# Patient Record
Sex: MALE | Race: OTHER | Employment: FULL TIME | URBAN - METROPOLITAN AREA
[De-identification: names, ages, dates, MRNs, and addresses within clinical notes are randomized per-mention and may not be internally consistent; named-entity substitution may affect disease eponyms.]

---

## 2017-12-16 ENCOUNTER — APPOINTMENT (OUTPATIENT)
Dept: CT IMAGING | Age: 42
End: 2017-12-16
Attending: NURSE PRACTITIONER
Payer: COMMERCIAL

## 2017-12-16 ENCOUNTER — HOSPITAL ENCOUNTER (EMERGENCY)
Age: 42
Discharge: HOME | End: 2017-12-16
Attending: EMERGENCY MEDICINE | Admitting: EMERGENCY MEDICINE
Payer: COMMERCIAL

## 2017-12-16 VITALS
SYSTOLIC BLOOD PRESSURE: 142 MMHG | BODY MASS INDEX: 29.38 KG/M2 | RESPIRATION RATE: 16 BRPM | DIASTOLIC BLOOD PRESSURE: 75 MMHG | HEART RATE: 88 BPM | HEIGHT: 65 IN | OXYGEN SATURATION: 96 % | WEIGHT: 176.37 LBS | TEMPERATURE: 98 F

## 2017-12-16 DIAGNOSIS — M54.50 ACUTE RIGHT-SIDED LOW BACK PAIN WITHOUT SCIATICA: Primary | ICD-10-CM

## 2017-12-16 LAB
ANION GAP SERPL CALC-SCNC: 9 MMOL/L (ref 5–15)
APPEARANCE UR: CLEAR
BACTERIA URNS QL MICRO: NEGATIVE /HPF
BASOPHILS # BLD: 0.1 K/UL (ref 0–0.1)
BASOPHILS NFR BLD: 1 % (ref 0–1)
BILIRUB UR QL: NEGATIVE
BUN SERPL-MCNC: 14 MG/DL (ref 6–20)
BUN/CREAT SERPL: 15 (ref 12–20)
CALCIUM SERPL-MCNC: 9.1 MG/DL (ref 8.5–10.1)
CHLORIDE SERPL-SCNC: 102 MMOL/L (ref 97–108)
CO2 SERPL-SCNC: 29 MMOL/L (ref 21–32)
COLOR UR: NORMAL
CREAT SERPL-MCNC: 0.95 MG/DL (ref 0.7–1.3)
DIFFERENTIAL METHOD BLD: NORMAL
EOSINOPHIL # BLD: 0.1 K/UL (ref 0–0.4)
EOSINOPHIL NFR BLD: 2 % (ref 0–7)
EPITH CASTS URNS QL MICRO: NORMAL /LPF
ERYTHROCYTE [DISTWIDTH] IN BLOOD BY AUTOMATED COUNT: 12.2 % (ref 11.5–14.5)
GLUCOSE SERPL-MCNC: 97 MG/DL (ref 65–100)
GLUCOSE UR STRIP.AUTO-MCNC: NEGATIVE MG/DL
HCT VFR BLD AUTO: 47 % (ref 36.6–50.3)
HGB BLD-MCNC: 16.5 G/DL (ref 12.1–17)
HGB UR QL STRIP: NEGATIVE
KETONES UR QL STRIP.AUTO: NEGATIVE MG/DL
LEUKOCYTE ESTERASE UR QL STRIP.AUTO: NEGATIVE
LYMPHOCYTES # BLD: 2.3 K/UL
LYMPHOCYTES NFR BLD: 33 % (ref 12–49)
MCH RBC QN AUTO: 31.1 PG (ref 26–34)
MCHC RBC AUTO-ENTMCNC: 35.1 G/DL (ref 30–36.5)
MCV RBC AUTO: 88.5 FL (ref 80–99)
MONOCYTES # BLD: 0.7 K/UL (ref 0–1)
MONOCYTES NFR BLD: 10 % (ref 5–13)
NEUTS SEG # BLD: 3.8 K/UL (ref 1.8–8)
NEUTS SEG NFR BLD: 54 % (ref 32–75)
NITRITE UR QL STRIP.AUTO: NEGATIVE
PH UR STRIP: 6 [PH] (ref 5–8)
PLATELET # BLD AUTO: 251 K/UL (ref 150–400)
POTASSIUM SERPL-SCNC: 3.8 MMOL/L (ref 3.5–5.1)
PROT UR STRIP-MCNC: NEGATIVE MG/DL
RBC # BLD AUTO: 5.31 M/UL (ref 4.1–5.7)
RBC #/AREA URNS HPF: NORMAL /HPF (ref 0–5)
RBC MORPH BLD: NORMAL
SODIUM SERPL-SCNC: 140 MMOL/L (ref 136–145)
SP GR UR REFRACTOMETRY: 1.02 (ref 1–1.03)
UA: UC IF INDICATED,UAUC: NORMAL
UROBILINOGEN UR QL STRIP.AUTO: 0.2 EU/DL (ref 0.2–1)
WBC # BLD AUTO: 7 K/UL (ref 4.1–11.1)
WBC MORPH BLD: NORMAL
WBC URNS QL MICRO: NORMAL /HPF (ref 0–4)

## 2017-12-16 PROCEDURE — 81001 URINALYSIS AUTO W/SCOPE: CPT | Performed by: NURSE PRACTITIONER

## 2017-12-16 PROCEDURE — 74011250636 HC RX REV CODE- 250/636: Performed by: NURSE PRACTITIONER

## 2017-12-16 PROCEDURE — 99284 EMERGENCY DEPT VISIT MOD MDM: CPT

## 2017-12-16 PROCEDURE — 74176 CT ABD & PELVIS W/O CONTRAST: CPT

## 2017-12-16 PROCEDURE — 85025 COMPLETE CBC W/AUTO DIFF WBC: CPT | Performed by: NURSE PRACTITIONER

## 2017-12-16 PROCEDURE — 36415 COLL VENOUS BLD VENIPUNCTURE: CPT | Performed by: NURSE PRACTITIONER

## 2017-12-16 PROCEDURE — 80048 BASIC METABOLIC PNL TOTAL CA: CPT | Performed by: NURSE PRACTITIONER

## 2017-12-16 PROCEDURE — 96374 THER/PROPH/DIAG INJ IV PUSH: CPT

## 2017-12-16 RX ORDER — CYCLOBENZAPRINE HCL 5 MG
5 TABLET ORAL
Qty: 15 TAB | Refills: 0 | Status: SHIPPED | OUTPATIENT
Start: 2017-12-16

## 2017-12-16 RX ORDER — KETOROLAC TROMETHAMINE 30 MG/ML
30 INJECTION, SOLUTION INTRAMUSCULAR; INTRAVENOUS ONCE
Status: COMPLETED | OUTPATIENT
Start: 2017-12-16 | End: 2017-12-16

## 2017-12-16 RX ORDER — PREDNISONE 50 MG/1
50 TABLET ORAL DAILY
Qty: 3 TAB | Refills: 0 | Status: SHIPPED | OUTPATIENT
Start: 2017-12-16 | End: 2017-12-19

## 2017-12-16 RX ORDER — ACETAMINOPHEN 500 MG
1000 TABLET ORAL
COMMUNITY

## 2017-12-16 RX ADMIN — KETOROLAC TROMETHAMINE 30 MG: 30 INJECTION, SOLUTION INTRAMUSCULAR at 13:38

## 2017-12-16 NOTE — ED TRIAGE NOTES
Pt ambulates to treatment area he states that for the past 2 weeks he has had some right lower back pain that radiates into the flank and to his groin. He denies any N/V or blood in his urine. Denies any urinary symptoms. He has taken some Tylenol intermittently over the past 2 weeks and it helps only a little.   Denies any fevers

## 2017-12-16 NOTE — ED PROVIDER NOTES
HPI Comments: Carlos Crowley is a 43 y.o. male who presents ambulatory to the ED with  c/o acute onset low back and right flank pain that extends into his groin. Patient states the pain started about two weeks ago and has intensified, now radiating into his groin and right leg. Patient states he has taken Tylenol for pain with little to no relief. He denies trauma or injury, denies any saddle anesthesia, hematuria or urgency/frequency. Patient denies any fever or chills, denies any nausea or vomiting. Denies any recent illness. There are no further complaints at this time. PCP: None    PMHx significant for: History reviewed. No pertinent past medical history. PSHx significant for: Past Surgical History:  No date: HX OTHER SURGICAL      Comment: rectal surgery    Social Hx: Tobacco: none EtOH: none Illicit drug use: none    There are no further complaints or symptoms at this time. The history is provided by the patient. History reviewed. No pertinent past medical history. Past Surgical History:   Procedure Laterality Date    HX OTHER SURGICAL      rectal surgery         History reviewed. No pertinent family history. Social History     Social History    Marital status:      Spouse name: N/A    Number of children: N/A    Years of education: N/A     Occupational History    Not on file. Social History Main Topics    Smoking status: Never Smoker    Smokeless tobacco: Never Used    Alcohol use No    Drug use: No    Sexual activity: Not on file     Other Topics Concern    Not on file     Social History Narrative         ALLERGIES: Review of patient's allergies indicates no known allergies. Review of Systems   Constitutional: Negative for activity change, appetite change, chills, fatigue and fever. HENT: Negative for congestion, ear discharge, ear pain, sinus pain, sinus pressure, sore throat and trouble swallowing.     Eyes: Negative for photophobia, pain, redness, itching and visual disturbance. Respiratory: Negative for chest tightness and shortness of breath. Cardiovascular: Negative for chest pain and palpitations. Gastrointestinal: Negative for abdominal distention, abdominal pain, nausea and vomiting. Endocrine: Negative. Genitourinary: Positive for flank pain. Negative for difficulty urinating, frequency and urgency. Musculoskeletal: Positive for back pain. Negative for neck pain and neck stiffness. Skin: Negative for color change, pallor, rash and wound. Allergic/Immunologic: Negative. Neurological: Negative for dizziness, syncope, weakness and headaches. Hematological: Does not bruise/bleed easily. Psychiatric/Behavioral: Negative for behavioral problems. The patient is not nervous/anxious. Vitals:    12/16/17 1315 12/16/17 1345 12/16/17 1400 12/16/17 1430   BP: 132/82 125/90 126/65 142/75   Pulse: 88      Resp: 16      Temp: 98 °F (36.7 °C)      SpO2: 96% 98% 98% 96%   Weight: 80 kg (176 lb 5.9 oz)      Height: 5' 5\" (1.651 m)               Physical Exam   Constitutional: He is oriented to person, place, and time. He appears well-developed and well-nourished. No distress. HENT:   Head: Normocephalic and atraumatic. Right Ear: External ear normal.   Left Ear: External ear normal.   Nose: Nose normal.   Mouth/Throat: Oropharynx is clear and moist.   Eyes: Conjunctivae and EOM are normal. Pupils are equal, round, and reactive to light. Right eye exhibits no discharge. Left eye exhibits no discharge. Neck: Normal range of motion. Neck supple. No JVD present. No tracheal deviation present. Cardiovascular: Normal rate, regular rhythm, normal heart sounds and intact distal pulses. Exam reveals no gallop. No murmur heard. Pulmonary/Chest: Effort normal and breath sounds normal. No respiratory distress. He has no wheezes. He has no rales. He exhibits no tenderness. Abdominal: Soft.  Bowel sounds are normal. He exhibits no distension. There is tenderness (right flank). There is no rebound and no guarding. Musculoskeletal: Normal range of motion. He exhibits tenderness (lumbar). He exhibits no edema. Neurological: He is alert and oriented to person, place, and time. Skin: Skin is warm and dry. No rash noted. No erythema. No pallor. Psychiatric: He has a normal mood and affect. His behavior is normal. Judgment and thought content normal.   Nursing note and vitals reviewed. MDM  Number of Diagnoses or Management Options  Acute right-sided low back pain without sciatica: new and requires workup  Diagnosis management comments: Plan:  Discharge to home and follow up with PCP. ED Course   1445: Reviewed CT scan. 1450: Spoke to the patient in regards to incidental finding of lung nodule. Patient states he is not a smoker and will follow up with PCP in six months. Name of PCP supplied. 3:11 PM  Pt has been reexamined. Pt has no new complaints, changes or physical findings. Care plan outlined and precautions discussed. All available results were reviewed with pt. All medications were reviewed with pt. All of pt's questions and concerns were addressed. Pt agrees to F/U as instructed and agrees to return to ED upon further deterioration. Pt is ready to go home.   Ru Query, NP      Procedures

## 2017-12-16 NOTE — ED NOTES
Plan of care and all test/meds ordered explained to pt and wife. Good understanding and agreement with plan was verbalized.

## 2017-12-16 NOTE — DISCHARGE INSTRUCTIONS
Dolor de espalda: Instrucciones de cuidado - [ Back Pain: Care Instructions ]  Instrucciones de cuidado    El dolor de espalda tiene muchas causas posibles. Con frecuencia, está relacionado con problemas en los músculos y ligamentos de la espalda. También podría estar relacionado con problemas de los nervios, discos o huesos de la espalda. Moverse, levantar algo, ponerse de pie, sentarse o dormir de Lisa incómoda pueden forzar la espalda. Algunas veces no se da cuenta de que tiene jer lesión [employer] tarde. La artritis es otra causa común del dolor de espalda. Aunque laura vez duela mucho, el dolor de espalda por lo general mejora por sí mismo en varias semanas. [address] personas se recuperan en 12 semanas o menos. Hacer un buen tratamiento en el hogar y tener cuidado de no forzar la espalda puede ayudar a que se sienta mejor más pronto. La atención de seguimiento es jer parte clave de copeland tratamiento y seguridad. Asegúrese de hacer y acudir a todas las citas, y llame a copeland médico si está teniendo problemas. También es jer buena idea saber los resultados de los exámenes y mantener jer lista de los medicamentos que marcie. ¿Cómo puede cuidarse en el hogar? · Siéntese o acuéstese en las posiciones más cómodas y que reduzcan el dolor. Trate jer de estas posiciones al acostarse:  ¨ Acuéstese boca arriba con las rodillas dobladas y apoyadas sobre almohadones grandes. ¨ Acuéstese en el piso con las piernas sobre el asiento de un sofá o jer silla. ¨ Acuéstese de lado con las rodillas y caderas dobladas y [place] entre las piernas. ¨ Acuéstese boca abajo siempre que esta posición no empeore el dolor. · No se siente en la cama y evite los sofás blandos y las posiciones torcidas. El reposo en cama puede aliviar el dolor al principio, breezy retrasa la sanación. Evite reposar en la cama después del primer día de dolor de espalda. · Cambie de posición cada 30 minutos.  Si debe sentarse por [employer], párese y descanse de estar sentado. Levántese y camine, o acuéstese en jer posición cómoda. · Pruebe usar jer almohadilla térmica a temperatura baja o mediana gabriela 15 a 20 minutos cada 2 ó 3 horas. Pruebe jer ducha tibia en vez de jer sesión con la almohadilla térmica. · También puede probar jer compresa de hielo gabriela 10 a 15 minutos cada 2 a 3 horas. Póngase un paño britton entre la compresa de hielo y la piel. · [employer] analgésicos (medicamentos para el dolor) exactamente según las indicaciones. ¨ Si el médico le recetó un analgésico, tómelo según las indicaciones. ¨ Si no está tomando un analgésico recetado, pregúntele a copeland médico si puede manuel fabi de [employer]. · Valery caminatas cortas varias veces al día. Usted puede comenzar con 5 a 10 minutos, 3 ó 4 veces al día, y aumentar progresivamente hasta lograr caminatas más largas. Camine en superficies migel y evite colinas y escaleras hasta que la espalda esté mejor. · Regrese al [place] y otras actividades lo más pronto posible. El descanso continuo sin actividad por lo general no es quiñonez para la espalda. · Para prevenir el dolor de espalda en el futuro, valery ejercicios para estirar y fortalecer la espalda y el abdomen. Aprenda a [employer], técnicas seguras para levantar peso y la mecánica corporal apropiada. ¿Cuándo debe pedir ayuda? Llame a copeland médico ahora mismo o busque atención médica inmediata si:  ? · Tiene un nuevo entumecimiento en [place]. ? · Tiene un nuevo debilitamiento en [place]. ([employer] puede hacer que sea difícil ponerse de pie). ? · Pierde el control de la vejiga o los intestinos. ?Preste especial atención a los cambios en copeland josee y asegúrese de comunicarse con copeland médico si:  ? · El dolor ELIZABETH. ? · No está mejorando después de 2 semanas. ¿Dónde puede encontrar más información en inglés? Robert Yee a [email or web address]   Escriba E043 en la búsqueda para aprender más acerca de \"Dolor de espalda: Instrucciones de cuidado - [ Back Pain: Care Instructions ]. \"  Revisado: 21 marzo, 2017  Versión del contenido: 11.4  © 7221-8578 [employer], [employer]. Las instrucciones de cuidado fueron adaptadas bajo licencia por [employer] (which disclaims liability or warranty for this information). Si usted tiene [place] afección médica o sobre estas instrucciones, siempre pregunte a copeland profesional de josee. [employer]wise, Incorporated niega toda garantía o responsabilidad por copeland uso de esta información.